# Patient Record
Sex: FEMALE | Race: WHITE | NOT HISPANIC OR LATINO | Employment: OTHER | URBAN - NONMETROPOLITAN AREA
[De-identification: names, ages, dates, MRNs, and addresses within clinical notes are randomized per-mention and may not be internally consistent; named-entity substitution may affect disease eponyms.]

---

## 2017-03-21 ENCOUNTER — OFFICE VISIT (OUTPATIENT)
Dept: CARDIOLOGY | Facility: CLINIC | Age: 67
End: 2017-03-21

## 2017-03-21 VITALS
HEART RATE: 65 BPM | WEIGHT: 115 LBS | DIASTOLIC BLOOD PRESSURE: 59 MMHG | OXYGEN SATURATION: 99 % | BODY MASS INDEX: 19.63 KG/M2 | HEIGHT: 64 IN | SYSTOLIC BLOOD PRESSURE: 121 MMHG

## 2017-03-21 DIAGNOSIS — R06.09 DYSPNEA ON EXERTION: Primary | ICD-10-CM

## 2017-03-21 DIAGNOSIS — I10 ESSENTIAL HYPERTENSION: ICD-10-CM

## 2017-03-21 DIAGNOSIS — R00.2 PALPITATIONS: ICD-10-CM

## 2017-03-21 PROCEDURE — 99213 OFFICE O/P EST LOW 20 MIN: CPT | Performed by: PHYSICIAN ASSISTANT

## 2017-03-21 RX ORDER — ATORVASTATIN CALCIUM 10 MG/1
10 TABLET, FILM COATED ORAL DAILY
COMMUNITY
Start: 2017-03-18

## 2017-03-21 RX ORDER — BENZTROPINE MESYLATE 1 MG/1
1 TABLET ORAL 2 TIMES DAILY
Refills: 2 | COMMUNITY
Start: 2017-03-13 | End: 2019-03-20 | Stop reason: DRUGHIGH

## 2017-03-21 NOTE — PROGRESS NOTES
Problem list     Subjective   Kellee Garcia is a 67 y.o. female     Chief Complaint   Patient presents with   • Follow-up     6 mo.   Problem List:      1. Minor nonobstructive CAD per cath, 2008.  2. Palpitations  3. Dyspnea, stable  4. TIA, by patient report.         HPI  The patient presents back today for routine evaluation.  Since last appointment, she has done well from cardiac standpoint.  Her palpitations remain very minimal on current verapamil dosing.  Her dyspnea seems to be at baseline.  She denies PND or orthopnea.  She relates no chest pain episodes.  She does note that her primary care provider follows labs closely.  She quotes those is normal.  The patient has no further complaints otherwise and feels that she is doing well from cardiac standpoint.    Current Outpatient Prescriptions   Medication Sig Dispense Refill   • aspirin 81 MG tablet Take 81 mg by mouth 1 (One) Time Per Week.     • atorvastatin (LIPITOR) 10 MG tablet Daily.     • benztropine (COGENTIN) 1 MG tablet 2 (Two) Times a Day.  2   • Black Cohosh 160 MG capsule Take  by mouth.     • calcium (OS-GERRY) 600 MG tablet Take  by mouth daily.     • cholecalciferol (VITAMIN D3) 1000 UNITS tablet Take 1,000 Units by mouth 2 (two) times a day.     • L-Arginine 500 MG capsule Take  by mouth.     • LIVALO 2 MG tablet tablet 2 mg Every Night.     • Omega 3 1000 MG capsule Take  by mouth Daily.     • omeprazole (PRILOSEC) 20 MG capsule Take 20 mg by mouth Daily.     • verapamil (CALAN) 40 MG tablet Take 40 mg by mouth 2 (Two) Times a Day.     • vitamin B-12 (CYANOCOBALAMIN) 100 MCG tablet Take  by mouth daily.       No current facility-administered medications for this visit.        Isosorbide nitrate and Sulfa antibiotics    Past Medical History   Diagnosis Date   • Hyperlipidemia    • Hypertension        Social History     Social History   • Marital status:      Spouse name: N/A   • Number of children: N/A   • Years of education: N/A  "    Occupational History   • Not on file.     Social History Main Topics   • Smoking status: Former Smoker   • Smokeless tobacco: Not on file   • Alcohol use No   • Drug use: No   • Sexual activity: Not on file     Other Topics Concern   • Not on file     Social History Narrative       Family History   Problem Relation Age of Onset   • Diabetes Other    • Hypertension Other        Review of Systems   Constitutional: Negative.    HENT: Negative.    Eyes: Positive for visual disturbance (wears glasses).   Respiratory: Negative.    Cardiovascular: Negative.    Gastrointestinal: Negative.    Endocrine: Negative.    Genitourinary: Negative.    Musculoskeletal: Positive for arthralgias (neck), myalgias and neck pain.   Skin: Negative.    Allergic/Immunologic: Negative.    Neurological: Negative.    Hematological: Negative.    Psychiatric/Behavioral: Negative.        Objective   Visit Vitals   • /59 (BP Location: Right arm, Patient Position: Sitting)   • Pulse 65   • Ht 64\" (162.6 cm)   • Wt 115 lb (52.2 kg)   • SpO2 99%   • BMI 19.74 kg/m2     Lab Results (most recent)     None        Physical Exam   Constitutional: She is oriented to person, place, and time. She appears well-developed and well-nourished. No distress.   HENT:   Head: Normocephalic and atraumatic.   Eyes: Conjunctivae and EOM are normal. Pupils are equal, round, and reactive to light.   Neck: Normal range of motion. Neck supple. No JVD present. No tracheal deviation present.   Cardiovascular: Normal rate, regular rhythm, normal heart sounds and intact distal pulses.    Pulmonary/Chest: Effort normal and breath sounds normal.   Abdominal: Soft. Bowel sounds are normal. She exhibits no distension and no mass. There is no tenderness. There is no rebound and no guarding.   Musculoskeletal: Normal range of motion. She exhibits no edema, tenderness or deformity.   Neurological: She is alert and oriented to person, place, and time.   Skin: Skin is warm and " dry. No rash noted. No erythema. No pallor.   Psychiatric: She has a normal mood and affect. Her behavior is normal. Judgment and thought content normal.   Nursing note and vitals reviewed.        Procedure   Procedures       Assessment/Plan      Diagnosis Plan   1. Dyspnea on exertion     2. Essential hypertension     3. Palpitations       The patient seems to be doing well at this time.  I will make no changes to medications.  We will see her back in 6 months, sooner if indicated by course.  She will call for any issues.

## 2018-03-21 ENCOUNTER — OFFICE VISIT (OUTPATIENT)
Dept: CARDIOLOGY | Facility: CLINIC | Age: 68
End: 2018-03-21

## 2018-03-21 VITALS
SYSTOLIC BLOOD PRESSURE: 126 MMHG | OXYGEN SATURATION: 100 % | HEART RATE: 64 BPM | BODY MASS INDEX: 20.18 KG/M2 | WEIGHT: 118.2 LBS | DIASTOLIC BLOOD PRESSURE: 65 MMHG | HEIGHT: 64 IN

## 2018-03-21 DIAGNOSIS — R06.02 SOB (SHORTNESS OF BREATH): Primary | ICD-10-CM

## 2018-03-21 DIAGNOSIS — R00.2 PALPITATIONS: ICD-10-CM

## 2018-03-21 DIAGNOSIS — E78.2 MIXED HYPERLIPIDEMIA: ICD-10-CM

## 2018-03-21 PROCEDURE — 93000 ELECTROCARDIOGRAM COMPLETE: CPT | Performed by: PHYSICIAN ASSISTANT

## 2018-03-21 PROCEDURE — 99213 OFFICE O/P EST LOW 20 MIN: CPT | Performed by: PHYSICIAN ASSISTANT

## 2018-03-21 NOTE — PROGRESS NOTES
Problem list     Subjective   Kellee Garcia is a 68 y.o. female     Chief Complaint   Patient presents with   • Follow-up     patient appears in office today for 6 month follow up    • Shortness of Breath   Problem List:      1. Minor nonobstructive CAD per cath, 2008.  2. Palpitations  3. Dyspnea, stable      4. TIA, by patient report.     HPI  The patient presents in today for routine evaluation follow-up.  She has done well over the past year.  She does relate to chronic palpitations which have been very minimal on verapamil therapy.  She tells me that she had recent labs with her primary care provider.  She quotes those as normal.  The patient denies chest pain.  She reports chronic, stable dyspnea.  She denies dizziness or syncope.  She exercises at moderate levels without limitation from cardiac standpoint.  She has no reproduction of dysrhythmic symptoms at that time.  The patient is doing well otherwise and has no further complaints.    Current Outpatient Prescriptions   Medication Sig Dispense Refill   • aspirin 81 MG tablet Take 81 mg by mouth 1 (One) Time Per Week.     • atorvastatin (LIPITOR) 10 MG tablet Take 10 mg by mouth Daily.     • benztropine (COGENTIN) 1 MG tablet Take 1 mg by mouth 2 (Two) Times a Day.  2   • Black Cohosh 160 MG capsule Take 160 mg by mouth Daily.     • calcium (OS-GERRY) 600 MG tablet Take 600 mg by mouth Daily.     • cholecalciferol (VITAMIN D3) 1000 UNITS tablet Take 1,000 Units by mouth 2 (two) times a day.     • L-Arginine 500 MG capsule Take 500 mg by mouth Daily.     • Omega 3 1000 MG capsule Take 1,000 mg by mouth Daily.     • omeprazole (PRILOSEC) 20 MG capsule Take 20 mg by mouth Daily.     • verapamil (CALAN) 40 MG tablet Take 40 mg by mouth 2 (Two) Times a Day.     • vitamin B-12 (CYANOCOBALAMIN) 100 MCG tablet Take 50 mcg by mouth Daily.       No current facility-administered medications for this visit.        Isosorbide nitrate and Sulfa antibiotics    Past Medical  History:   Diagnosis Date   • Hyperlipidemia    • Hypertension        Social History     Social History   • Marital status:      Spouse name: N/A   • Number of children: N/A   • Years of education: N/A     Occupational History   • Not on file.     Social History Main Topics   • Smoking status: Former Smoker   • Smokeless tobacco: Never Used   • Alcohol use No   • Drug use: No   • Sexual activity: Defer     Other Topics Concern   • Not on file     Social History Narrative   • No narrative on file       Family History   Problem Relation Age of Onset   • Diabetes Other    • Hypertension Other        Review of Systems   Constitutional: Negative.  Negative for fatigue.   HENT: Negative.  Negative for congestion, rhinorrhea, sneezing and sore throat.    Eyes: Positive for visual disturbance (wears glasses daily).   Respiratory: Positive for shortness of breath (with exertion). Negative for apnea, cough, chest tightness and wheezing.    Cardiovascular: Negative.  Negative for chest pain (denies CP), palpitations (denies palpitations) and leg swelling.   Gastrointestinal: Negative.  Negative for abdominal distention, abdominal pain, nausea and vomiting.   Endocrine: Negative.  Negative for cold intolerance, heat intolerance, polyphagia and polyuria.   Genitourinary: Negative.  Negative for difficulty urinating, frequency and urgency.   Musculoskeletal: Negative.  Negative for arthralgias, back pain, myalgias, neck pain and neck stiffness.   Skin: Negative.  Negative for rash and wound.   Allergic/Immunologic: Negative.  Negative for environmental allergies and food allergies.   Neurological: Negative.  Negative for dizziness, weakness, light-headedness and headaches.   Hematological: Negative.  Does not bruise/bleed easily.   Psychiatric/Behavioral: Negative.  Negative for agitation, confusion and sleep disturbance (denies waking up smothering/SOA). The patient is not nervous/anxious.        Objective   Vitals:     "03/21/18 0902   BP: 126/65   BP Location: Left arm   Patient Position: Sitting   Pulse: 64   SpO2: 100%   Weight: 53.6 kg (118 lb 3.2 oz)   Height: 162.6 cm (64\")      /65 (BP Location: Left arm, Patient Position: Sitting)   Pulse 64   Ht 162.6 cm (64\")   Wt 53.6 kg (118 lb 3.2 oz)   SpO2 100%   BMI 20.29 kg/m²    Lab Results (most recent)     None        Physical Exam   Constitutional: She is oriented to person, place, and time. She appears well-developed and well-nourished. No distress.   HENT:   Head: Normocephalic and atraumatic.   Eyes: Conjunctivae and EOM are normal. Pupils are equal, round, and reactive to light.   Neck: Normal range of motion. Neck supple. No JVD present. No tracheal deviation present.   Cardiovascular: Normal rate, regular rhythm, normal heart sounds and intact distal pulses.    Pulmonary/Chest: Effort normal and breath sounds normal.   Abdominal: Soft. Bowel sounds are normal. She exhibits no distension and no mass. There is no tenderness. There is no rebound and no guarding.   Musculoskeletal: Normal range of motion. She exhibits no edema, tenderness or deformity.   Neurological: She is alert and oriented to person, place, and time.   Skin: Skin is warm and dry. No rash noted. No erythema. No pallor.   Psychiatric: She has a normal mood and affect. Her behavior is normal. Judgment and thought content normal.   Nursing note and vitals reviewed.        Procedure     ECG 12 Lead  Date/Time: 3/21/2018 9:07 AM  Performed by: VILMA ROQUE  Authorized by: VILMA ROQUE   Comments: SOB    Sinus bradycardia at 50, normal axis, minor nonspecific ST and T-wave changes, no acute changes noted.               Assessment/Plan      Diagnosis Plan   1. SOB (shortness of breath)  ECG 12 Lead   2. Palpitations     3. Mixed hyperlipidemia         The patient is stable at this time.  I will make no changes.  We will see her back in one year, sooner if indicated by course.  She will call for " any issues prior to follow-up.            Discussed the patient's BMI with her. BMI is within normal parameters. No follow-up required.             Electronically signed by:

## 2019-03-20 ENCOUNTER — OFFICE VISIT (OUTPATIENT)
Dept: CARDIOLOGY | Facility: CLINIC | Age: 69
End: 2019-03-20

## 2019-03-20 VITALS
HEART RATE: 64 BPM | OXYGEN SATURATION: 96 % | SYSTOLIC BLOOD PRESSURE: 122 MMHG | DIASTOLIC BLOOD PRESSURE: 62 MMHG | WEIGHT: 115.8 LBS | BODY MASS INDEX: 19.77 KG/M2 | HEIGHT: 64 IN

## 2019-03-20 DIAGNOSIS — E78.2 MIXED HYPERLIPIDEMIA: ICD-10-CM

## 2019-03-20 DIAGNOSIS — R00.2 PALPITATIONS: Primary | ICD-10-CM

## 2019-03-20 DIAGNOSIS — R06.09 DYSPNEA ON EXERTION: ICD-10-CM

## 2019-03-20 PROCEDURE — 99213 OFFICE O/P EST LOW 20 MIN: CPT | Performed by: PHYSICIAN ASSISTANT

## 2019-03-20 RX ORDER — LINACLOTIDE 290 UG/1
290 CAPSULE, GELATIN COATED ORAL DAILY
COMMUNITY
Start: 2019-02-20

## 2019-03-20 NOTE — PROGRESS NOTES
Problem list     Subjective   Kellee Garcia is a 69 y.o. female     Chief Complaint   Patient presents with   • Coronary Artery Disease   • Hypertension   • Palpitations   Problem List:      1. Minor nonobstructive CAD per cath, 2008.  2. Palpitations  3. Dyspnea, stable      4. TIA, by patient report.     HPI  The patient presents in today for routine follow-up.  Since last appointment, she is continued to do well.  Symptoms of palpitations remain very minimal with verapamil therapy.  She has no sustained dysrhythmic activity.  The patient tells me she has stable dyspnea.  She has no chest pain.  She can exert at above average levels without reproduction of angina or limitation with cardiovascular symptoms or issues otherwise.  Blood pressures remain well controlled.  She does tell me that she has had recent laboratories.  Lipids in particular were normal.  The patient has no further complaints otherwise and feels that she is doing well.    Current Outpatient Medications   Medication Sig Dispense Refill   • aspirin 81 MG tablet Take 81 mg by mouth 1 (One) Time Per Week.     • atorvastatin (LIPITOR) 10 MG tablet Take 10 mg by mouth Daily.     • Black Cohosh 160 MG capsule Take 160 mg by mouth Daily.     • calcium (OS-GERRY) 600 MG tablet Take 600 mg by mouth Daily.     • cholecalciferol (VITAMIN D3) 1000 UNITS tablet Take 1,000 Units by mouth 2 (two) times a day.     • L-Arginine 500 MG capsule Take 500 mg by mouth Daily.     • Omega 3 1000 MG capsule Take 1,000 mg by mouth Daily.     • omeprazole (PRILOSEC) 20 MG capsule Take 20 mg by mouth Daily.     • verapamil (CALAN) 40 MG tablet Take 40 mg by mouth 2 (Two) Times a Day.     • vitamin B-12 (CYANOCOBALAMIN) 100 MCG tablet Take 50 mcg by mouth Daily.     • LINZESS 290 MCG capsule capsule Take 290 mcg by mouth Daily.       No current facility-administered medications for this visit.        Isosorbide nitrate and Sulfa antibiotics    Past Medical History:   Diagnosis  "Date   • Hyperlipidemia    • Hypertension        Social History     Socioeconomic History   • Marital status:      Spouse name: Not on file   • Number of children: Not on file   • Years of education: Not on file   • Highest education level: Not on file   Tobacco Use   • Smoking status: Former Smoker   • Smokeless tobacco: Never Used   Substance and Sexual Activity   • Alcohol use: No   • Drug use: No   • Sexual activity: Defer       Family History   Problem Relation Age of Onset   • Diabetes Other    • Hypertension Other        Review of Systems   Constitutional: Negative for fatigue.   HENT: Negative for congestion and sneezing.    Eyes: Positive for visual disturbance (contacts daily\).   Respiratory: Negative.  Negative for apnea, cough and wheezing.    Cardiovascular: Negative.  Negative for chest pain (denies CP), palpitations and leg swelling.   Gastrointestinal: Negative.  Negative for abdominal pain, nausea and vomiting.   Endocrine: Negative.  Negative for cold intolerance and heat intolerance.   Genitourinary: Negative.  Negative for difficulty urinating, frequency and urgency.   Musculoskeletal: Negative.  Negative for arthralgias, back pain, neck pain and neck stiffness.   Allergic/Immunologic: Negative.  Negative for environmental allergies and food allergies.   Neurological: Negative.  Negative for dizziness, syncope, weakness, light-headedness and headaches.   Hematological: Negative.  Does not bruise/bleed easily.   Psychiatric/Behavioral: Negative for agitation, confusion and sleep disturbance (denies wakening felling smothering/SOA). The patient is not nervous/anxious.        Objective   Vitals:    03/20/19 0850   BP: 122/62   Pulse: 64   SpO2: 96%   Weight: 52.5 kg (115 lb 12.8 oz)   Height: 162.6 cm (64\")      /62   Pulse 64   Ht 162.6 cm (64\")   Wt 52.5 kg (115 lb 12.8 oz)   SpO2 96%   BMI 19.88 kg/m²    Lab Results (most recent)     None        Physical Exam   Constitutional: " She is oriented to person, place, and time. She appears well-developed and well-nourished. No distress.   HENT:   Head: Normocephalic and atraumatic.   Eyes: Conjunctivae and EOM are normal. Pupils are equal, round, and reactive to light.   Neck: Normal range of motion. Neck supple. No JVD present. No tracheal deviation present.   Cardiovascular: Normal rate, regular rhythm, normal heart sounds and intact distal pulses.   Pulmonary/Chest: Effort normal and breath sounds normal.   Abdominal: Soft. Bowel sounds are normal. She exhibits no distension and no mass. There is no tenderness. There is no rebound and no guarding.   Musculoskeletal: Normal range of motion. She exhibits no edema, tenderness or deformity.   Neurological: She is alert and oriented to person, place, and time.   Skin: Skin is warm and dry. No rash noted. No erythema. No pallor.   Psychiatric: She has a normal mood and affect. Her behavior is normal. Judgment and thought content normal.   Nursing note and vitals reviewed.        Procedure   Procedures       Assessment/Plan      Diagnosis Plan   1. Palpitations     2. Dyspnea on exertion     3. Mixed hyperlipidemia         The patient continues to do well from a cardiovascular standpoint.  Symptoms of palpitations are now minimal with verapamil therapy.  Most recent lipids, per patient report, were normal.  We will continue atorvastatin therapy with no changes in that regard.  For any complications, the patient will call.  Otherwise, we will see her back on 6-month intervals.              Patient's Body mass index is 19.88 kg/m². BMI is below normal parameters. Recommendations include: referral to primary care.             Electronically signed by:

## 2020-09-22 ENCOUNTER — OFFICE VISIT (OUTPATIENT)
Dept: CARDIOLOGY | Facility: CLINIC | Age: 70
End: 2020-09-22

## 2020-09-22 VITALS
HEIGHT: 64 IN | OXYGEN SATURATION: 99 % | DIASTOLIC BLOOD PRESSURE: 78 MMHG | BODY MASS INDEX: 20.01 KG/M2 | TEMPERATURE: 97.5 F | HEART RATE: 56 BPM | SYSTOLIC BLOOD PRESSURE: 126 MMHG | WEIGHT: 117.2 LBS

## 2020-09-22 DIAGNOSIS — R06.09 DYSPNEA ON EXERTION: ICD-10-CM

## 2020-09-22 DIAGNOSIS — R00.2 PALPITATIONS: ICD-10-CM

## 2020-09-22 DIAGNOSIS — I10 ESSENTIAL HYPERTENSION: Primary | ICD-10-CM

## 2020-09-22 PROCEDURE — 99213 OFFICE O/P EST LOW 20 MIN: CPT | Performed by: PHYSICIAN ASSISTANT

## 2020-09-22 PROCEDURE — 93000 ELECTROCARDIOGRAM COMPLETE: CPT | Performed by: PHYSICIAN ASSISTANT

## 2020-09-22 RX ORDER — PANTOPRAZOLE SODIUM 40 MG/1
TABLET, DELAYED RELEASE ORAL DAILY
COMMUNITY
Start: 2020-07-22

## 2020-09-22 NOTE — PROGRESS NOTES
Problem list     Subjective   Kellee Garcia is a 70 y.o. female     Chief Complaint   Patient presents with   • Follow-up     Here for a one year follow up    • Hypertension   • Shortness of Breath   Problem List:      1. Minor nonobstructive CAD per cath, 2008.  2. Palpitations  3. Dyspnea, stable      4. TIA, by patient report.     HPI  This pleasant patient presents back into the the clinic today for routine evaluation and follow-up.  We have seen her given cardiovascular history as outlined above.  Currently, the patient is doing well.  She still reports no significant palpitations on verapamil therapy.  She denies dizziness or syncope.  She has stable dyspnea.  She has no chest pain.  She denies failure symptoms.  Labs are followed closely by her primary care provider and reported as normal by the patient.  She has no further complaints otherwise and feels that she is doing well.    Current Outpatient Medications on File Prior to Visit   Medication Sig Dispense Refill   • aspirin 81 MG tablet Take 81 mg by mouth 1 (One) Time Per Week.     • atorvastatin (LIPITOR) 10 MG tablet Take 10 mg by mouth Daily.     • Black Cohosh 160 MG capsule Take 160 mg by mouth Daily.     • calcium (OS-GERRY) 600 MG tablet Take 600 mg by mouth Daily.     • cholecalciferol (VITAMIN D3) 1000 UNITS tablet Take 1,000 Units by mouth 2 (two) times a day.     • L-Arginine 500 MG capsule Take 500 mg by mouth Daily.     • LINZESS 290 MCG capsule capsule Take 290 mcg by mouth Daily.     • Omega 3 1000 MG capsule Take 1,000 mg by mouth Daily.     • pantoprazole (PROTONIX) 40 MG EC tablet Daily.     • verapamil (CALAN) 40 MG tablet Take 40 mg by mouth 2 (Two) Times a Day.     • vitamin B-12 (CYANOCOBALAMIN) 100 MCG tablet Take 50 mcg by mouth Daily.     • [DISCONTINUED] omeprazole (PRILOSEC) 20 MG capsule Take 20 mg by mouth Daily.       No current facility-administered medications on file prior to visit.        Isosorbide nitrate and Sulfa  "antibiotics    Past Medical History:   Diagnosis Date   • Hyperlipidemia    • Hypertension        Social History     Socioeconomic History   • Marital status:      Spouse name: Not on file   • Number of children: Not on file   • Years of education: Not on file   • Highest education level: Not on file   Tobacco Use   • Smoking status: Former Smoker   • Smokeless tobacco: Never Used   Substance and Sexual Activity   • Alcohol use: No   • Drug use: No   • Sexual activity: Defer       Family History   Problem Relation Age of Onset   • Diabetes Other    • Hypertension Other        Review of Systems   Constitutional: Negative.  Negative for chills, diaphoresis, fatigue and fever.   HENT: Negative.    Respiratory: Positive for shortness of breath (on exertion). Negative for apnea, cough, chest tightness and wheezing.    Cardiovascular: Negative.  Negative for chest pain, palpitations and leg swelling.   Gastrointestinal: Negative.  Negative for abdominal pain, blood in stool, constipation, diarrhea, nausea and vomiting.   Genitourinary: Negative.  Negative for hematuria.   Musculoskeletal: Negative.  Negative for arthralgias, back pain, myalgias and neck pain.   Skin: Negative.  Negative for rash and wound.   Allergic/Immunologic: Negative.  Negative for environmental allergies and food allergies.   Neurological: Negative.  Negative for dizziness, syncope, weakness, light-headedness, numbness and headaches.   Hematological: Bruises/bleeds easily.   Psychiatric/Behavioral: Positive for agitation. Negative for sleep disturbance. The patient is nervous/anxious.        Objective   Vitals:    09/22/20 1249   BP: 126/78   BP Location: Left arm   Patient Position: Sitting   Pulse: 56   Temp: 97.5 °F (36.4 °C)   SpO2: 99%   Weight: 53.2 kg (117 lb 3.2 oz)   Height: 162.6 cm (64\")      /78 (BP Location: Left arm, Patient Position: Sitting)   Pulse 56   Temp 97.5 °F (36.4 °C)   Ht 162.6 cm (64\")   Wt 53.2 kg (117 lb " 3.2 oz)   SpO2 99%   BMI 20.12 kg/m²    Lab Results (most recent)     None        Physical Exam  Vitals signs and nursing note reviewed.   Constitutional:       General: She is not in acute distress.     Appearance: She is well-developed.   HENT:      Head: Normocephalic and atraumatic.   Eyes:      Conjunctiva/sclera: Conjunctivae normal.      Pupils: Pupils are equal, round, and reactive to light.   Neck:      Musculoskeletal: Normal range of motion and neck supple.      Vascular: No JVD.      Trachea: No tracheal deviation.   Cardiovascular:      Rate and Rhythm: Normal rate and regular rhythm.      Heart sounds: Normal heart sounds.   Pulmonary:      Effort: Pulmonary effort is normal.      Breath sounds: Normal breath sounds.   Abdominal:      General: Bowel sounds are normal. There is no distension.      Palpations: Abdomen is soft. There is no mass.      Tenderness: There is no abdominal tenderness. There is no guarding or rebound.   Musculoskeletal: Normal range of motion.         General: No tenderness or deformity.   Skin:     General: Skin is warm and dry.      Coloration: Skin is not pale.      Findings: No erythema or rash.   Neurological:      Mental Status: She is alert and oriented to person, place, and time.   Psychiatric:         Behavior: Behavior normal.         Thought Content: Thought content normal.         Judgment: Judgment normal.           Procedure     ECG 12 Lead    Date/Time: 9/22/2020 1:16 PM  Performed by: Eamon Gayle PA  Authorized by: Eamon Gayle PA   Comparison: compared with previous ECG from 3/21/2018  Comparison to previous ECG: Sinus rhythm at 57, normal axis, minor nonspecific ST and T wave changes, no acute changes noted.                 Assessment/Plan      Diagnosis Plan   1. Essential hypertension  ECG 12 Lead   2. Dyspnea on exertion     3. Palpitations       1.  At this time, the patient appears to be doing well from cardiovascular standpoint.  The patient  reports no current angina.  She has stable dyspnea.  She denies failure issues otherwise.  Palpitations remain very well treated on verapamil therapy.    2.  She remains normotensive on current medical regimen as well.  She will continue to monitor that closely at home and will call to us for any issues.    3.  In that setting, as the patient is doing well, we will make no changes or adjustments otherwise.  We will continue to see her on yearly evaluation.  She will call for complications.         Kellee BECKY Garcia  reports that she has quit smoking. She has never used smokeless tobacco..        Patient's Body mass index is 20.12 kg/m². BMI is within normal parameters. No follow-up required..     Advance Care Planning   ACP discussion was declined by the patient. Patient has an advance directive in EMR which is still valid.           Electronically signed by:

## 2021-05-21 ENCOUNTER — IMMUNIZATION (OUTPATIENT)
Dept: VACCINE CLINIC | Facility: HOSPITAL | Age: 71
End: 2021-05-21

## 2021-05-21 PROCEDURE — 0001A: CPT | Performed by: INTERNAL MEDICINE

## 2021-05-21 PROCEDURE — 91300 HC SARSCOV02 VAC 30MCG/0.3ML IM: CPT | Performed by: INTERNAL MEDICINE

## 2021-06-03 ENCOUNTER — HOSPITAL ENCOUNTER (EMERGENCY)
Facility: HOSPITAL | Age: 71
Discharge: HOME | End: 2021-06-03
Attending: FAMILY MEDICINE | Admitting: FAMILY MEDICINE

## 2021-06-03 ENCOUNTER — APPOINTMENT (OUTPATIENT)
Dept: CT IMAGING | Facility: HOSPITAL | Age: 71
End: 2021-06-03

## 2021-06-03 ENCOUNTER — APPOINTMENT (OUTPATIENT)
Dept: GENERAL RADIOLOGY | Facility: HOSPITAL | Age: 71
End: 2021-06-03

## 2021-06-03 VITALS
DIASTOLIC BLOOD PRESSURE: 64 MMHG | TEMPERATURE: 97.5 F | WEIGHT: 105 LBS | RESPIRATION RATE: 18 BRPM | BODY MASS INDEX: 17.93 KG/M2 | OXYGEN SATURATION: 99 % | HEIGHT: 64 IN | SYSTOLIC BLOOD PRESSURE: 133 MMHG | HEART RATE: 74 BPM

## 2021-06-03 DIAGNOSIS — R10.84 GENERALIZED ABDOMINAL PAIN: Primary | ICD-10-CM

## 2021-06-03 DIAGNOSIS — N39.0 ACUTE UTI: ICD-10-CM

## 2021-06-03 LAB
ALBUMIN SERPL-MCNC: 3.96 G/DL (ref 3.5–5.2)
ALBUMIN/GLOB SERPL: 1.3 G/DL
ALP SERPL-CCNC: 84 U/L (ref 39–117)
ALT SERPL W P-5'-P-CCNC: 19 U/L (ref 1–33)
AMYLASE SERPL-CCNC: 99 U/L (ref 28–100)
ANION GAP SERPL CALCULATED.3IONS-SCNC: 10.8 MMOL/L (ref 5–15)
AST SERPL-CCNC: 20 U/L (ref 1–32)
BACTERIA UR QL AUTO: ABNORMAL /HPF
BASOPHILS # BLD AUTO: 0.05 10*3/MM3 (ref 0–0.2)
BASOPHILS NFR BLD AUTO: 0.6 % (ref 0–1.5)
BILIRUB SERPL-MCNC: 0.5 MG/DL (ref 0–1.2)
BILIRUB UR QL STRIP: NEGATIVE
BUN SERPL-MCNC: 14 MG/DL (ref 8–23)
BUN/CREAT SERPL: 15.4 (ref 7–25)
CALCIUM SPEC-SCNC: 9.4 MG/DL (ref 8.6–10.5)
CHLORIDE SERPL-SCNC: 101 MMOL/L (ref 98–107)
CLARITY UR: CLEAR
CO2 SERPL-SCNC: 27.2 MMOL/L (ref 22–29)
COLOR UR: YELLOW
CREAT SERPL-MCNC: 0.91 MG/DL (ref 0.57–1)
CRP SERPL-MCNC: <0.3 MG/DL (ref 0–0.5)
DEPRECATED RDW RBC AUTO: 45.3 FL (ref 37–54)
EOSINOPHIL # BLD AUTO: 0.04 10*3/MM3 (ref 0–0.4)
EOSINOPHIL NFR BLD AUTO: 0.5 % (ref 0.3–6.2)
ERYTHROCYTE [DISTWIDTH] IN BLOOD BY AUTOMATED COUNT: 12.8 % (ref 12.3–15.4)
GFR SERPL CREATININE-BSD FRML MDRD: 61 ML/MIN/1.73
GLOBULIN UR ELPH-MCNC: 3.1 GM/DL
GLUCOSE SERPL-MCNC: 115 MG/DL (ref 65–99)
GLUCOSE UR STRIP-MCNC: NEGATIVE MG/DL
HCT VFR BLD AUTO: 40.1 % (ref 34–46.6)
HGB BLD-MCNC: 13.1 G/DL (ref 12–15.9)
HGB UR QL STRIP.AUTO: ABNORMAL
HOLD SPECIMEN: NORMAL
HOLD SPECIMEN: NORMAL
HYALINE CASTS UR QL AUTO: ABNORMAL /LPF
IMM GRANULOCYTES # BLD AUTO: 0.02 10*3/MM3 (ref 0–0.05)
IMM GRANULOCYTES NFR BLD AUTO: 0.2 % (ref 0–0.5)
KETONES UR QL STRIP: NEGATIVE
LEUKOCYTE ESTERASE UR QL STRIP.AUTO: ABNORMAL
LIPASE SERPL-CCNC: 46 U/L (ref 13–60)
LYMPHOCYTES # BLD AUTO: 1.07 10*3/MM3 (ref 0.7–3.1)
LYMPHOCYTES NFR BLD AUTO: 13.3 % (ref 19.6–45.3)
MAGNESIUM SERPL-MCNC: 1.9 MG/DL (ref 1.6–2.4)
MCH RBC QN AUTO: 31.3 PG (ref 26.6–33)
MCHC RBC AUTO-ENTMCNC: 32.7 G/DL (ref 31.5–35.7)
MCV RBC AUTO: 95.9 FL (ref 79–97)
MONOCYTES # BLD AUTO: 0.73 10*3/MM3 (ref 0.1–0.9)
MONOCYTES NFR BLD AUTO: 9.1 % (ref 5–12)
NEUTROPHILS NFR BLD AUTO: 6.13 10*3/MM3 (ref 1.7–7)
NEUTROPHILS NFR BLD AUTO: 76.3 % (ref 42.7–76)
NITRITE UR QL STRIP: NEGATIVE
NRBC BLD AUTO-RTO: 0 /100 WBC (ref 0–0.2)
PH UR STRIP.AUTO: 6.5 [PH] (ref 5–8)
PLATELET # BLD AUTO: 362 10*3/MM3 (ref 140–450)
PMV BLD AUTO: 8.7 FL (ref 6–12)
POTASSIUM SERPL-SCNC: 3.6 MMOL/L (ref 3.5–5.2)
PROT SERPL-MCNC: 7.1 G/DL (ref 6–8.5)
PROT UR QL STRIP: NEGATIVE
RBC # BLD AUTO: 4.18 10*6/MM3 (ref 3.77–5.28)
RBC # UR: ABNORMAL /HPF
REF LAB TEST METHOD: ABNORMAL
SODIUM SERPL-SCNC: 139 MMOL/L (ref 136–145)
SP GR UR STRIP: 1.01 (ref 1–1.03)
SQUAMOUS #/AREA URNS HPF: ABNORMAL /HPF
TROPONIN T SERPL-MCNC: <0.01 NG/ML (ref 0–0.03)
TROPONIN T SERPL-MCNC: <0.01 NG/ML (ref 0–0.03)
UROBILINOGEN UR QL STRIP: ABNORMAL
WBC # BLD AUTO: 8.04 10*3/MM3 (ref 3.4–10.8)
WBC UR QL AUTO: ABNORMAL /HPF
WHOLE BLOOD HOLD SPECIMEN: NORMAL
WHOLE BLOOD HOLD SPECIMEN: NORMAL

## 2021-06-03 PROCEDURE — 87077 CULTURE AEROBIC IDENTIFY: CPT | Performed by: PHYSICIAN ASSISTANT

## 2021-06-03 PROCEDURE — 71045 X-RAY EXAM CHEST 1 VIEW: CPT | Performed by: RADIOLOGY

## 2021-06-03 PROCEDURE — 83735 ASSAY OF MAGNESIUM: CPT | Performed by: PHYSICIAN ASSISTANT

## 2021-06-03 PROCEDURE — 85025 COMPLETE CBC W/AUTO DIFF WBC: CPT | Performed by: PHYSICIAN ASSISTANT

## 2021-06-03 PROCEDURE — 87086 URINE CULTURE/COLONY COUNT: CPT | Performed by: PHYSICIAN ASSISTANT

## 2021-06-03 PROCEDURE — 83690 ASSAY OF LIPASE: CPT | Performed by: PHYSICIAN ASSISTANT

## 2021-06-03 PROCEDURE — 74176 CT ABD & PELVIS W/O CONTRAST: CPT

## 2021-06-03 PROCEDURE — 82150 ASSAY OF AMYLASE: CPT | Performed by: PHYSICIAN ASSISTANT

## 2021-06-03 PROCEDURE — 96361 HYDRATE IV INFUSION ADD-ON: CPT

## 2021-06-03 PROCEDURE — 71045 X-RAY EXAM CHEST 1 VIEW: CPT

## 2021-06-03 PROCEDURE — 86140 C-REACTIVE PROTEIN: CPT | Performed by: PHYSICIAN ASSISTANT

## 2021-06-03 PROCEDURE — 93010 ELECTROCARDIOGRAM REPORT: CPT | Performed by: INTERNAL MEDICINE

## 2021-06-03 PROCEDURE — 81001 URINALYSIS AUTO W/SCOPE: CPT | Performed by: PHYSICIAN ASSISTANT

## 2021-06-03 PROCEDURE — 87186 SC STD MICRODIL/AGAR DIL: CPT | Performed by: PHYSICIAN ASSISTANT

## 2021-06-03 PROCEDURE — 74176 CT ABD & PELVIS W/O CONTRAST: CPT | Performed by: RADIOLOGY

## 2021-06-03 PROCEDURE — 80053 COMPREHEN METABOLIC PANEL: CPT | Performed by: PHYSICIAN ASSISTANT

## 2021-06-03 PROCEDURE — 99283 EMERGENCY DEPT VISIT LOW MDM: CPT

## 2021-06-03 PROCEDURE — 84484 ASSAY OF TROPONIN QUANT: CPT | Performed by: PHYSICIAN ASSISTANT

## 2021-06-03 PROCEDURE — 93005 ELECTROCARDIOGRAM TRACING: CPT | Performed by: PHYSICIAN ASSISTANT

## 2021-06-03 PROCEDURE — 96360 HYDRATION IV INFUSION INIT: CPT

## 2021-06-03 RX ORDER — CEFDINIR 300 MG/1
300 CAPSULE ORAL 2 TIMES DAILY
Qty: 14 CAPSULE | Refills: 0 | Status: SHIPPED | OUTPATIENT
Start: 2021-06-03 | End: 2021-06-10

## 2021-06-03 RX ORDER — ALUMINA, MAGNESIA, AND SIMETHICONE 2400; 2400; 240 MG/30ML; MG/30ML; MG/30ML
15 SUSPENSION ORAL ONCE
Status: COMPLETED | OUTPATIENT
Start: 2021-06-03 | End: 2021-06-03

## 2021-06-03 RX ORDER — SODIUM CHLORIDE 9 MG/ML
75 INJECTION, SOLUTION INTRAVENOUS CONTINUOUS
Status: DISCONTINUED | OUTPATIENT
Start: 2021-06-03 | End: 2021-06-03 | Stop reason: HOSPADM

## 2021-06-03 RX ORDER — SUCRALFATE 1 G/1
1 TABLET ORAL 4 TIMES DAILY
Qty: 56 TABLET | Refills: 0 | Status: SHIPPED | OUTPATIENT
Start: 2021-06-03

## 2021-06-03 RX ORDER — SODIUM CHLORIDE 0.9 % (FLUSH) 0.9 %
10 SYRINGE (ML) INJECTION AS NEEDED
Status: DISCONTINUED | OUTPATIENT
Start: 2021-06-03 | End: 2021-06-03 | Stop reason: HOSPADM

## 2021-06-03 RX ORDER — LIDOCAINE HYDROCHLORIDE 20 MG/ML
15 SOLUTION OROPHARYNGEAL ONCE
Status: COMPLETED | OUTPATIENT
Start: 2021-06-03 | End: 2021-06-03

## 2021-06-03 RX ADMIN — LIDOCAINE HYDROCHLORIDE 15 ML: 20 SOLUTION ORAL; TOPICAL at 11:44

## 2021-06-03 RX ADMIN — SODIUM CHLORIDE 75 ML/HR: 9 INJECTION, SOLUTION INTRAVENOUS at 11:44

## 2021-06-03 RX ADMIN — SODIUM CHLORIDE 75 ML/HR: 9 INJECTION, SOLUTION INTRAVENOUS at 14:01

## 2021-06-03 RX ADMIN — ALUMINUM HYDROXIDE, MAGNESIUM HYDROXIDE, AND DIMETHICONE 15 ML: 400; 400; 40 SUSPENSION ORAL at 11:44

## 2021-06-03 NOTE — ED NOTES
I WENT TO GET PT SECOND TROP PT WAS NOT IN ROOM AT THIS TIME.      Martina Lowry P  06/03/21 [id number]

## 2021-06-03 NOTE — ED PROVIDER NOTES
"Subjective   71-year-old female who presents to the ED today for a burning abdominal pain.  She states this has been going on for about 5 days.  She states she is having a burning pain from her lower abdomen that goes all the way up into her mouth.  She states that she is having a burning sensation to her lips.  She states it has progressively gotten worse.  She also states she feels like the skin on her arms is on fire.  She denies any sort of rash.  She states she does have a history of GERD but this does not feel like that.  She states eating does not change her symptoms.  She is already on Protonix and she has also tried some Pepcid at home.  She states \"it feels like a chemical is eating my stomach.\"  She denies any fever.  She denies any nausea, vomiting or diarrhea.  She states she has been having normal bowel movements.  She denies any blood in her stool or black tarry stool.  She states she has had some dysuria.  She states she did recently get her first Covid vaccine on May 20.  She states she got the Pfizer vaccine.      History provided by:  Patient  Abdominal Pain  Pain location:  Generalized  Pain quality: burning    Pain radiation: to her mouth.  Pain severity:  Moderate  Onset quality:  Gradual  Duration:  5 days  Timing:  Constant  Progression:  Worsening  Chronicity:  New  Relieved by:  Nothing  Worsened by:  Nothing  Associated symptoms: dysuria    Associated symptoms: no anorexia, no chest pain, no chills, no constipation, no cough, no diarrhea, no fatigue, no fever, no hematemesis, no hematochezia, no hematuria, no melena, no nausea, no shortness of breath and no vomiting        Review of Systems   Constitutional: Negative.  Negative for chills, fatigue and fever.   HENT: Negative.    Eyes: Negative.    Respiratory: Negative.  Negative for cough and shortness of breath.    Cardiovascular: Negative for chest pain.   Gastrointestinal: Positive for abdominal pain. Negative for anorexia, " constipation, diarrhea, hematemesis, hematochezia, melena, nausea and vomiting.   Genitourinary: Positive for dysuria. Negative for difficulty urinating and hematuria.   Musculoskeletal: Negative.    Skin: Negative.    Neurological: Negative.    Psychiatric/Behavioral: Negative.    All other systems reviewed and are negative.      Past Medical History:   Diagnosis Date   • Hyperlipidemia    • Hypertension        Allergies   Allergen Reactions   • Contrast Dye Unknown - Low Severity   • Isosorbide Nitrate Headache     Dinitrate TABS   • Sulfa Antibiotics Rash       Past Surgical History:   Procedure Laterality Date   • ANKLE SURGERY     • PROSTATE SURGERY     • SHOULDER SURGERY         Family History   Problem Relation Age of Onset   • Diabetes Other    • Hypertension Other        Social History     Socioeconomic History   • Marital status:      Spouse name: Not on file   • Number of children: Not on file   • Years of education: Not on file   • Highest education level: Not on file   Tobacco Use   • Smoking status: Former Smoker   • Smokeless tobacco: Never Used   Substance and Sexual Activity   • Alcohol use: No   • Drug use: No   • Sexual activity: Defer           Objective   Physical Exam  Vitals and nursing note reviewed.   Constitutional:       General: She is not in acute distress.     Appearance: She is well-developed. She is not diaphoretic.   HENT:      Head: Normocephalic and atraumatic.      Mouth/Throat:      Mouth: Mucous membranes are moist.      Pharynx: Oropharynx is clear. No pharyngeal swelling or oropharyngeal exudate.   Eyes:      Extraocular Movements: Extraocular movements intact.      Pupils: Pupils are equal, round, and reactive to light.   Cardiovascular:      Rate and Rhythm: Normal rate and regular rhythm.      Heart sounds: Normal heart sounds.   Pulmonary:      Effort: Pulmonary effort is normal.      Breath sounds: Normal breath sounds.   Chest:      Chest wall: No tenderness.    Abdominal:      General: Bowel sounds are normal.      Palpations: Abdomen is soft.      Tenderness: There is no abdominal tenderness. There is no right CVA tenderness, left CVA tenderness, guarding or rebound.   Skin:     General: Skin is warm and dry.      Capillary Refill: Capillary refill takes less than 2 seconds.      Findings: No rash.   Neurological:      General: No focal deficit present.      Mental Status: She is alert and oriented to person, place, and time.   Psychiatric:         Mood and Affect: Mood normal.         Procedures           ED Course  ED Course as of Jun 03 1610   Thu Jun 03, 2021   1120 EKG normal sinus rhythm at rate 75 parable 44 QRS 72  no ST elevation    [BB]   1228 FINDINGS:    Lungs:  Emphysema is noted with mild hyperinflation.    Pleural space:  Unremarkable.  No pneumothorax.    Heart:  Unremarkable.  No cardiomegaly.    Mediastinum:  Unremarkable.    Bones/joints:  Unremarkable.     IMPRESSION:    No acute cardiopulmonary findings identified.   XR Chest 1 View [AH]   1233 Patient reports that the GI cocktail did help for a little bit but then her symptoms came back.  She also reports a tick bite under her right breast.  She removed the tick as soon as she noticed it.  No evidence of any rash at this time.    [AH]   1239 Patient reports an allergy to IV contrast at this time.    [AH]   1351 FINDINGS:    Lung bases:  Emphysema of the lung bases.      ABDOMEN:    Liver:  Numerous low-density liver lesions compatible with multiple  hepatic cysts. There is a dominant cyst in the right lobe of liver that  is 12.3 x 10.6 cm.  The liver is otherwise unremarkable.    Gallbladder and bile ducts:  Unremarkable.  No calcified stones.  No  ductal dilation.    Pancreas:  Unremarkable.  No ductal dilation.    Spleen:  Unremarkable.  No splenomegaly.    Adrenals:  Unremarkable.  No mass.    Kidneys and ureters:  Unremarkable.  No obstructing stones.  No  hydronephrosis.    Stomach and  bowel:  Unremarkable.  No obstruction.  No mucosal  thickening.      PELVIS:    Appendix:  The appendix is nonvisualized.    Bladder:  Unremarkable.  No stones.    Reproductive:  Hysterectomy changes are noted.      ABDOMEN and PELVIS:    Intraperitoneal space:  No pneumoperitoneum identified.  No  significant fluid collection.    Bones/joints:  Degenerative changes of the lumbar spine with mild  stenosis noted.  No acute fracture.  No dislocation.    Soft tissues:  Unremarkable.    Vasculature:  Unremarkable.  No abdominal aortic aneurysm.    Lymph nodes:  Unremarkable.  No enlarged lymph nodes.     IMPRESSION:  1.  No acute findings identified within abdomen or pelvis.  2.  Multiple hepatic cysts with a dominant 12.3 cm cyst in the right  lobe of liver.  3.  Other incidental and nonacute findings detailed above.   CT Abdomen Pelvis Without Contrast [AH]   1423 No acute findings on the patient's ED work-up.  She did have 13-20 white blood cells on her microscopic urinalysis.  There is no bacteria seen.  She states she has been having dysuria.  She will be treated for an acute UTI with Omnicef.  We will also start her on Carafate to see if this helps with the burning in her abdomen.  She has had 2 troponins that have been negative.  There are no other acute findings on her lab work.  Her CT scan and chest x-ray show no acute findings.  At this time she will be able to be discharged home to follow-up outpatient.  She already has a scheduled appointment with her GI provider on June 14.  She will return to the ED if her symptoms change or worsen.    [AH]      ED Course User Index  [AH] Kiara Mata PA  [BB] James Becker MD                                           MDM  Number of Diagnoses or Management Options     Amount and/or Complexity of Data Reviewed  Clinical lab tests: reviewed  Tests in the radiology section of CPT®: reviewed  Tests in the medicine section of CPT®: reviewed    Patient Progress  Patient  progress: stable      Final diagnoses:   Generalized abdominal pain   Acute UTI       ED Disposition  ED Disposition     ED Disposition Condition Comment    Discharge Stable           Wilmer Moser MD  [address] [id number] [phone]    Schedule an appointment as soon as possible for a visit in 4 days           Medication List      New Prescriptions    cefdinir 300 MG capsule  Commonly known as: OMNICEF  Take 1 capsule by mouth 2 (Two) Times a Day for 7 days.     sucralfate 1 g tablet  Commonly known as: CARAFATE  Take 1 tablet by mouth 4 (Four) Times a Day.           Where to Get Your Medications      You can get these medications from any pharmacy    Bring a paper prescription for each of these medications  · cefdinir 300 MG capsule  · sucralfate 1 g tablet          Kiara Mata PA  06/03/21 [id number]

## 2021-06-03 NOTE — ED NOTES
Went to get Consent signed for CT with contrast, pt states she is allergic to contrast dye. Kiara BRITO made aware.        Jen Koroma RN  06/03/21 1234       Jen Koroma RN  06/03/21 1230

## 2021-06-05 LAB
BACTERIA SPEC AEROBE CULT: ABNORMAL
QT INTERVAL: 394 MS
QTC INTERVAL: 439 MS

## 2021-06-11 ENCOUNTER — IMMUNIZATION (OUTPATIENT)
Dept: VACCINE CLINIC | Facility: HOSPITAL | Age: 71
End: 2021-06-11

## 2021-06-11 PROCEDURE — 0002A: CPT | Performed by: INTERNAL MEDICINE

## 2021-06-11 PROCEDURE — 91300 HC SARSCOV02 VAC 30MCG/0.3ML IM: CPT | Performed by: INTERNAL MEDICINE

## 2021-09-22 ENCOUNTER — OFFICE VISIT (OUTPATIENT)
Dept: CARDIOLOGY | Facility: CLINIC | Age: 71
End: 2021-09-22

## 2021-09-22 VITALS
BODY MASS INDEX: 19.6 KG/M2 | WEIGHT: 114.8 LBS | HEART RATE: 55 BPM | OXYGEN SATURATION: 99 % | SYSTOLIC BLOOD PRESSURE: 134 MMHG | DIASTOLIC BLOOD PRESSURE: 51 MMHG | HEIGHT: 64 IN

## 2021-09-22 DIAGNOSIS — I10 ESSENTIAL HYPERTENSION: ICD-10-CM

## 2021-09-22 DIAGNOSIS — R06.09 DYSPNEA ON EXERTION: Primary | ICD-10-CM

## 2021-09-22 DIAGNOSIS — R00.2 PALPITATIONS: ICD-10-CM

## 2021-09-22 PROCEDURE — 99213 OFFICE O/P EST LOW 20 MIN: CPT | Performed by: PHYSICIAN ASSISTANT

## 2021-09-22 NOTE — PATIENT INSTRUCTIONS

## 2021-09-22 NOTE — PROGRESS NOTES
Problem list     Subjective   Kellee Garcia is a 71 y.o. female     Chief Complaint   Patient presents with   • Follow-up   Problem List:      1. Minor nonobstructive CAD per cath, 2008.  2. Palpitations  3. Dyspnea, stable      4. TIA, by patient report.     HPI  The patient presents into the clinic today for yearly follow-up.  Since last evaluation, the patient is continued to do well.  She really has had no further palpitations of any significance.  Her dyspnea is at baseline.  She has stable fatigue.  The patient has no failure or further dysrhythmic symptoms at this time.  Medications are very well-tolerated for the patient.  Since being on verapamil, again, she really has had no issues with palpitations.  She has no further complaints otherwise and feels that she is doing well.  She does note that recent laboratories have been normal through her primary care provider.    Current Outpatient Medications on File Prior to Visit   Medication Sig Dispense Refill   • aspirin 81 MG tablet Take 81 mg by mouth 1 (One) Time Per Week.     • atorvastatin (LIPITOR) 10 MG tablet Take 10 mg by mouth Daily.     • Black Cohosh 160 MG capsule Take 160 mg by mouth Daily.     • calcium (OS-GERRY) 600 MG tablet Take 600 mg by mouth Daily.     • cholecalciferol (VITAMIN D3) 1000 UNITS tablet Take 1,000 Units by mouth 2 (two) times a day.     • LINZESS 290 MCG capsule capsule Take 290 mcg by mouth Daily.     • Omega 3 1000 MG capsule Take 1,000 mg by mouth Daily.     • pantoprazole (PROTONIX) 40 MG EC tablet Daily.     • sucralfate (CARAFATE) 1 g tablet Take 1 tablet by mouth 4 (Four) Times a Day. 56 tablet 0   • verapamil (CALAN) 40 MG tablet Take 40 mg by mouth 2 (Two) Times a Day.     • vitamin B-12 (CYANOCOBALAMIN) 100 MCG tablet Take 50 mcg by mouth Daily.     • L-Arginine 500 MG capsule Take 500 mg by mouth Daily.       No current facility-administered medications on file prior to visit.       Contrast dye, Isosorbide nitrate,  "and Sulfa antibiotics    Past Medical History:   Diagnosis Date   • Hyperlipidemia    • Hypertension        Social History     Socioeconomic History   • Marital status:      Spouse name: Not on file   • Number of children: Not on file   • Years of education: Not on file   • Highest education level: Not on file   Tobacco Use   • Smoking status: Former Smoker   • Smokeless tobacco: Never Used   Substance and Sexual Activity   • Alcohol use: No   • Drug use: No   • Sexual activity: Defer       Family History   Problem Relation Age of Onset   • Diabetes Other    • Hypertension Other        Review of Systems   Constitutional: Negative.  Negative for chills, fatigue and fever.   HENT: Positive for rhinorrhea. Negative for congestion and sore throat.    Eyes: Positive for visual disturbance (glasses / contacts).   Respiratory: Negative.  Negative for chest tightness, shortness of breath and wheezing.    Cardiovascular: Negative.  Negative for chest pain, palpitations and leg swelling.   Gastrointestinal: Negative.    Endocrine: Negative.    Genitourinary: Negative.    Musculoskeletal: Negative.  Negative for arthralgias, back pain and neck pain.   Skin: Negative.  Negative for rash and wound.   Allergic/Immunologic: Positive for environmental allergies.   Neurological: Negative.  Negative for dizziness, weakness, numbness and headaches.   Hematological: Bruises/bleeds easily (bruises/ bleeds).   Psychiatric/Behavioral: Positive for sleep disturbance (falling. staying asleep ).       Objective   Vitals:    09/22/21 0914   BP: 134/51   BP Location: Left arm   Patient Position: Sitting   Pulse: 55   SpO2: 99%   Weight: 52.1 kg (114 lb 12.8 oz)   Height: 162.6 cm (64\")      /51 (BP Location: Left arm, Patient Position: Sitting)   Pulse 55   Ht 162.6 cm (64\")   Wt 52.1 kg (114 lb 12.8 oz)   SpO2 99%   BMI 19.71 kg/m²    Lab Results (most recent)     None        Physical Exam  Vitals and nursing note reviewed. "   Constitutional:       General: She is not in acute distress.     Appearance: She is well-developed.   HENT:      Head: Normocephalic and atraumatic.   Eyes:      Conjunctiva/sclera: Conjunctivae normal.      Pupils: Pupils are equal, round, and reactive to light.   Neck:      Vascular: No JVD.      Trachea: No tracheal deviation.   Cardiovascular:      Rate and Rhythm: Normal rate and regular rhythm.      Heart sounds: Normal heart sounds.   Pulmonary:      Effort: Pulmonary effort is normal.      Breath sounds: Normal breath sounds.   Abdominal:      General: Bowel sounds are normal. There is no distension.      Palpations: Abdomen is soft. There is no mass.      Tenderness: There is no abdominal tenderness. There is no guarding or rebound.   Musculoskeletal:         General: No tenderness or deformity. Normal range of motion.      Cervical back: Normal range of motion and neck supple.   Skin:     General: Skin is warm and dry.      Coloration: Skin is not pale.      Findings: No erythema or rash.   Neurological:      Mental Status: She is alert and oriented to person, place, and time.   Psychiatric:         Behavior: Behavior normal.         Thought Content: Thought content normal.         Judgment: Judgment normal.           Procedure   Procedures       Assessment/Plan      Diagnosis Plan   1. Dyspnea on exertion     2. Palpitations     3. Essential hypertension       1.  Clinically, the patient is doing well.  Her dyspnea is at baseline.  She really has no palpitations at this time.  She has no further cardiovascular symptoms or issues at this time.    2.  The patient continues to be normotensive when checked at home.  She will monitor that and call to us for any issues.    3.  I would make no adjustments in medical regimen.  The patient will call to us for any complications.  Nothing further and we will continue to see her on a yearly evaluation.                   Electronically signed by:

## 2022-09-22 ENCOUNTER — OFFICE VISIT (OUTPATIENT)
Dept: CARDIOLOGY | Facility: CLINIC | Age: 72
End: 2022-09-22

## 2022-09-22 VITALS
SYSTOLIC BLOOD PRESSURE: 137 MMHG | BODY MASS INDEX: 19.77 KG/M2 | HEART RATE: 69 BPM | HEIGHT: 64 IN | OXYGEN SATURATION: 97 % | DIASTOLIC BLOOD PRESSURE: 77 MMHG | WEIGHT: 115.8 LBS

## 2022-09-22 DIAGNOSIS — I10 ESSENTIAL HYPERTENSION: ICD-10-CM

## 2022-09-22 DIAGNOSIS — R00.2 PALPITATIONS: Primary | ICD-10-CM

## 2022-09-22 DIAGNOSIS — R06.09 DYSPNEA ON EXERTION: ICD-10-CM

## 2022-09-22 PROCEDURE — 93000 ELECTROCARDIOGRAM COMPLETE: CPT | Performed by: PHYSICIAN ASSISTANT

## 2022-09-22 PROCEDURE — 99213 OFFICE O/P EST LOW 20 MIN: CPT | Performed by: PHYSICIAN ASSISTANT

## 2022-09-22 RX ORDER — ACETAMINOPHEN,DIPHENHYDRAMINE HCL 500; 25 MG/1; MG/1
1 TABLET, FILM COATED ORAL NIGHTLY PRN
COMMUNITY

## 2022-09-22 NOTE — PROGRESS NOTES
Problem list     Subjective   Kellee Garcia is a 72 y.o. female     Chief Complaint   Patient presents with   • Follow-up     Annual F/U    Problem List:      1. Minor nonobstructive CAD per cath, 2008.  2. Palpitations  3. Dyspnea, stable      4. TIA, by patient report.   HPI    The patient presents in the clinic today for routine evaluation and follow-up.  She has continued to do well since last evaluation.  Palpitations remain minimal since starting verapamil.  She certainly has no sustained dysrhythmic activity.  She has no dizziness nor syncope.  Dizziness is at baseline.  She has no chest pain.  Exercise capacity is adequate and without limitation from cardiovascular standpoint.  Laboratories have been normal as followed through her primary care provider.  The patient has no further complaints.  Current Outpatient Medications on File Prior to Visit   Medication Sig Dispense Refill   • aspirin 81 MG tablet Take 81 mg by mouth 1 (One) Time Per Week.     • atorvastatin (LIPITOR) 10 MG tablet Take 10 mg by mouth Daily.     • Black Cohosh 160 MG capsule Take 160 mg by mouth Daily.     • cholecalciferol (VITAMIN D3) 1000 UNITS tablet Take 1,000 Units by mouth 2 (two) times a day.     • diphenhydrAMINE-acetaminophen (TYLENOL PM)  MG tablet per tablet Take 1 tablet by mouth At Night As Needed for Sleep.     • L-Arginine 500 MG capsule Take 500 mg by mouth Daily.     • LINZESS 290 MCG capsule capsule Take 290 mcg by mouth Daily.     • Omega 3 1000 MG capsule Take 1,000 mg by mouth Daily.     • pantoprazole (PROTONIX) 40 MG EC tablet Daily.     • sucralfate (CARAFATE) 1 g tablet Take 1 tablet by mouth 4 (Four) Times a Day. 56 tablet 0   • verapamil (CALAN) 40 MG tablet Take 40 mg by mouth 2 (Two) Times a Day.     • vitamin B-12 (CYANOCOBALAMIN) 100 MCG tablet Take 50 mcg by mouth Daily.     • [DISCONTINUED] calcium (OS-GERRY) 600 MG tablet Take 600 mg by mouth Daily.       No current facility-administered  "medications on file prior to visit.       Contrast dye, Isosorbide nitrate, and Sulfa antibiotics    Past Medical History:   Diagnosis Date   • Hyperlipidemia    • Hypertension        Social History     Socioeconomic History   • Marital status:    Tobacco Use   • Smoking status: Former Smoker   • Smokeless tobacco: Never Used   Substance and Sexual Activity   • Alcohol use: No   • Drug use: No   • Sexual activity: Defer       Family History   Problem Relation Age of Onset   • Diabetes Other    • Hypertension Other        Review of Systems   Constitutional: Negative for fatigue.   HENT: Negative for congestion, rhinorrhea and sore throat.    Eyes: Positive for visual disturbance (Glasses or contacts daily ).   Respiratory: Negative.  Negative for chest tightness and shortness of breath.    Cardiovascular: Negative.  Negative for chest pain, palpitations and leg swelling.   Gastrointestinal: Negative.  Negative for abdominal pain, blood in stool, constipation, diarrhea, nausea and vomiting.   Endocrine: Negative.  Negative for cold intolerance and heat intolerance.        Feet get cold- denies numbness    Genitourinary: Negative.  Negative for difficulty urinating, dysuria, frequency, hematuria and urgency.   Musculoskeletal: Negative.  Negative for arthralgias, back pain and neck pain.   Skin: Negative.  Negative for rash and wound.   Allergic/Immunologic: Positive for environmental allergies. Negative for food allergies.   Neurological: Negative for dizziness, syncope, weakness, light-headedness, numbness and headaches.   Hematological: Bruises/bleeds easily (Bruises).   Psychiatric/Behavioral: Positive for sleep disturbance (Off and on sleep issues- uses Tylenol pm PRN ).       Objective   Vitals:    09/22/22 0855   BP: 137/77   Pulse: 69   SpO2: 97%   Weight: 52.5 kg (115 lb 12.8 oz)   Height: 162.6 cm (64\")      /77   Pulse 69   Ht 162.6 cm (64\")   Wt 52.5 kg (115 lb 12.8 oz)   SpO2 97%   BMI " 19.88 kg/m²    Lab Results (most recent)     None        Physical Exam  Vitals and nursing note reviewed.   Constitutional:       General: She is not in acute distress.     Appearance: She is well-developed.   HENT:      Head: Normocephalic and atraumatic.   Eyes:      Conjunctiva/sclera: Conjunctivae normal.      Pupils: Pupils are equal, round, and reactive to light.   Neck:      Vascular: No JVD.      Trachea: No tracheal deviation.   Cardiovascular:      Rate and Rhythm: Normal rate and regular rhythm.      Heart sounds: Normal heart sounds.   Pulmonary:      Effort: Pulmonary effort is normal.      Breath sounds: Normal breath sounds.   Abdominal:      General: Bowel sounds are normal. There is no distension.      Palpations: Abdomen is soft. There is no mass.      Tenderness: There is no abdominal tenderness. There is no guarding or rebound.   Musculoskeletal:         General: No tenderness or deformity. Normal range of motion.      Cervical back: Normal range of motion and neck supple.   Skin:     General: Skin is warm and dry.      Coloration: Skin is not pale.      Findings: No erythema or rash.   Neurological:      Mental Status: She is alert and oriented to person, place, and time.   Psychiatric:         Behavior: Behavior normal.         Thought Content: Thought content normal.         Judgment: Judgment normal.           Procedure     ECG 12 Lead    Date/Time: 9/22/2022 8:55 AM  Performed by: Eamon Gayle PA  Authorized by: Eamon Gayle PA   Comparison: compared with previous ECG from 6/5/2021  Comparison to previous ECG: Sinus rhythm, rate 57, normal axis, no acute changes noted.                 Assessment & Plan      Diagnosis Plan   1. Palpitations     2. Dyspnea on exertion     3. Essential hypertension       1.  At this time, the patient appears to be doing well.  Palpitations are minimal on current medical regimen by her report.  She will call back for any recurrence of symptoms.    2.   Dyspnea remains at baseline as well.  She currently has no further cardiovascular symptoms.    3.  She typically is normotensive when checked at home.  She will monitor that and call for any complications.    4.  As the patient is doing well, nothing further and we will continue to see her on routine yearly follow-ups.        Advance Care Planning   ACP discussion was declined by the patient. Patient has an advance directive (not in EMR), copy requested.             Electronically signed by:

## 2023-09-18 ENCOUNTER — TELEPHONE (OUTPATIENT)
Dept: CARDIOLOGY | Facility: CLINIC | Age: 73
End: 2023-09-18

## 2023-09-18 NOTE — TELEPHONE ENCOUNTER
Caller: Jorge Garcia    Relationship to patient: Emergency Contact    Best call back number: [phone]    Chief complaint: PT HAD A DEATH IN THE FAMILY AND IS NEEDING TO SCHEDULE OUT IN CONJUNCTION WITH HER .     Type of visit: ANNUAL FU    Requested date: ASAP, BUT IN CONJUNCTION WITH HER  EXCEPT 09.29.23    If rescheduling, when is the original appointment: 09.22.23     Additional notes: [facility] HAS NOTHING UNTIL MAY OF 2024

## 2023-09-21 NOTE — TELEPHONE ENCOUNTER
S/w pt she stated she was not having any problems it was ok to do her appt next available. Pt was ok with a May 2024 appt. Told pt if developed any problems to call us we would try to get her worked in with someone.

## 2024-04-24 ENCOUNTER — TELEPHONE (OUTPATIENT)
Dept: CARDIOLOGY | Facility: CLINIC | Age: 74
End: 2024-04-24
Payer: MEDICARE

## 2024-04-24 NOTE — TELEPHONE ENCOUNTER
REQUEST FOR CARDIAC CLEARANCE    Caller name: Kellee Garcia     Phone Number: [phone]    Surgeon's name: DR. REESE    Type of planned surgery: SHOULDER SURGERY    Date of planned surgery: 4.30.2024    Type of anesthesia: UNKNOWN    Have you been experiencing chest pain or shortness of breath? NO    Is your doctor requesting for you to stop any of your medications prior to your surgery? NONE    Where should we fax the clearance to? [phone] PLEASE CALL THE PATIENT TO ADVISE ANY ISSUES.

## 2024-04-24 NOTE — TELEPHONE ENCOUNTER
RECEIVED CC LATER YESTERDAY AFTERNOON, HOWEVER PT HAS NOT BEEN SEEN SINCE 2022. I JUST CALLED THE PT AND LEFT A VOICEMAIL TO OFFER AN APPT WITH GENNY FOR THIS AFTERNOON @ 1:15.

## 2024-04-24 NOTE — TELEPHONE ENCOUNTER
Called patient informed her that Dr. Farias is performing surgery we will need them to fax our office a cardiac clearance letter as I do not see one in her chart. Patient states I do not have heart issues I see beulah for preventative measures.     Called spoke with Alexandra at [facility] requested clearance letter be faxed.

## 2024-04-24 NOTE — TELEPHONE ENCOUNTER
relay      Got a hold of the pt, PT sated  [facility] told her not to worry about getting a CC, that she didn't need one. Called [facility] to confirm but was unable to get a hold of an MA, LVM to call back if they did not need a CC.

## 2024-07-18 ENCOUNTER — OFFICE VISIT (OUTPATIENT)
Dept: CARDIOLOGY | Facility: CLINIC | Age: 74
End: 2024-07-18
Payer: MEDICARE

## 2024-07-18 VITALS
SYSTOLIC BLOOD PRESSURE: 139 MMHG | DIASTOLIC BLOOD PRESSURE: 65 MMHG | HEIGHT: 64 IN | WEIGHT: 108.2 LBS | HEART RATE: 60 BPM | OXYGEN SATURATION: 98 % | BODY MASS INDEX: 18.47 KG/M2

## 2024-07-18 DIAGNOSIS — R06.09 DYSPNEA ON EXERTION: ICD-10-CM

## 2024-07-18 DIAGNOSIS — I10 PRIMARY HYPERTENSION: ICD-10-CM

## 2024-07-18 DIAGNOSIS — R00.2 PALPITATIONS: Primary | ICD-10-CM

## 2024-07-18 RX ORDER — IPRATROPIUM BROMIDE 21 UG/1
2 SPRAY, METERED NASAL DAILY
COMMUNITY
Start: 2024-04-22

## 2024-07-18 RX ORDER — GINSENG 100 MG
CAPSULE ORAL
COMMUNITY

## 2024-07-18 NOTE — PROGRESS NOTES
Problem list     Subjective   Kellee Garcia is a 74 y.o. female     Chief Complaint   Patient presents with    Follow-up     Yearly follow up    Problem List:      1. Minor nonobstructive CAD per cath, 2008.  2. Palpitations  3. Dyspnea, stable      4. TIA, by patient report.     HPI  The patient presents in the clinic today for routine evaluation and follow-up.  She continues to do well at this time.  She reports no palpitations on current medical regimen.  The patient denies chest pain.  Dyspnea is at baseline.  Exercise capacity is adequate and without limitation.  To her knowledge, she typically is normotensive.  She has no further complaints and feels that she is doing very well.    Current Outpatient Medications on File Prior to Visit   Medication Sig Dispense Refill    aspirin 81 MG tablet Take 1 tablet by mouth 1 (One) Time Per Week.      atorvastatin (LIPITOR) 10 MG tablet Take 1 tablet by mouth Daily.      Black Cohosh 160 MG capsule Take 160 mg by mouth Daily.      cholecalciferol (VITAMIN D3) 1000 UNITS tablet Take 1 tablet by mouth 2 (Two) Times a Day.      diphenhydrAMINE-acetaminophen (TYLENOL PM)  MG tablet per tablet Take 1 tablet by mouth At Night As Needed for Sleep.      ipratropium (ATROVENT) 0.03 % nasal spray 2 sprays into the nostril(s) as directed by provider Daily.      LINZESS 290 MCG capsule capsule Take 1 capsule by mouth Daily.      pantoprazole (PROTONIX) 40 MG EC tablet Daily.      verapamil (CALAN) 40 MG tablet Take 0.5 tablets by mouth Daily.      vitamin B-12 (CYANOCOBALAMIN) 100 MCG tablet Take 0.5 tablets by mouth Daily.      Zinc 50 MG tablet Take  by mouth. daily      [DISCONTINUED] L-Arginine 500 MG capsule Take 500 mg by mouth Daily.      [DISCONTINUED] Omega 3 1000 MG capsule Take 1,000 mg by mouth Daily.      [DISCONTINUED] sucralfate (CARAFATE) 1 g tablet Take 1 tablet by mouth 4 (Four) Times a Day. 56 tablet 0     No current facility-administered medications on  "file prior to visit.       Contrast dye (echo or unknown ct/mr), Isosorbide nitrate, Sulfa antibiotics, and Wound dressing adhesive    Past Medical History:   Diagnosis Date    Abnormal ECG     Hyperlipidemia     Hypertension        Social History     Socioeconomic History    Marital status:    Tobacco Use    Smoking status: Former     Current packs/day: 0.00     Average packs/day: 1 pack/day for 15.0 years (15.0 ttl pk-yrs)     Types: Cigarettes     Quit date: 2006     Years since quittin.6    Smokeless tobacco: Never   Substance and Sexual Activity    Alcohol use: No    Drug use: No    Sexual activity: Not Currently     Partners: Male     Birth control/protection: None       Family History   Problem Relation Age of Onset    Diabetes Other     Hypertension Other        Review of Systems   Constitutional: Negative.  Negative for chills, diaphoresis, fatigue and fever.   HENT: Negative.     Eyes: Negative.  Negative for visual disturbance.   Respiratory: Negative.  Negative for apnea, cough, chest tightness, shortness of breath and wheezing.    Cardiovascular: Negative.  Negative for chest pain, palpitations and leg swelling.   Gastrointestinal: Negative.  Negative for abdominal pain and blood in stool.   Endocrine: Negative.    Genitourinary: Negative.  Negative for hematuria.   Musculoskeletal:  Positive for arthralgias and neck stiffness. Negative for back pain, myalgias and neck pain.   Skin: Negative.  Negative for rash and wound.   Allergic/Immunologic: Negative.  Negative for environmental allergies and food allergies.   Neurological: Negative.  Negative for dizziness, syncope, weakness, light-headedness, numbness and headaches.   Hematological:  Bruises/bleeds easily (bruises easily).   Psychiatric/Behavioral: Negative.  Negative for sleep disturbance.        Objective   Vitals:    24 0909   BP: 139/65   Pulse: 60   SpO2: 98%   Weight: 49.1 kg (108 lb 3.2 oz)   Height: 162.6 cm (64\") " "     /65   Pulse 60   Ht 162.6 cm (64\")   Wt 49.1 kg (108 lb 3.2 oz)   SpO2 98%   BMI 18.57 kg/m²    Lab Results (most recent)       None          Physical Exam  Vitals and nursing note reviewed.   Constitutional:       General: She is not in acute distress.     Appearance: She is well-developed.   HENT:      Head: Normocephalic and atraumatic.   Eyes:      Conjunctiva/sclera: Conjunctivae normal.      Pupils: Pupils are equal, round, and reactive to light.   Neck:      Vascular: No JVD.      Trachea: No tracheal deviation.   Cardiovascular:      Rate and Rhythm: Normal rate and regular rhythm.      Heart sounds: Normal heart sounds.   Pulmonary:      Effort: Pulmonary effort is normal.      Breath sounds: Normal breath sounds.   Abdominal:      General: Bowel sounds are normal. There is no distension.      Palpations: Abdomen is soft. There is no mass.      Tenderness: There is no abdominal tenderness. There is no guarding or rebound.   Musculoskeletal:         General: No tenderness or deformity. Normal range of motion.      Cervical back: Normal range of motion and neck supple.   Skin:     General: Skin is warm and dry.      Coloration: Skin is not pale.      Findings: No erythema or rash.   Neurological:      Mental Status: She is alert and oriented to person, place, and time.   Psychiatric:         Behavior: Behavior normal.         Thought Content: Thought content normal.         Judgment: Judgment normal.           Procedure     ECG 12 Lead    Date/Time: 7/18/2024 9:12 AM  Performed by: Eamon Gayle PA    Authorized by: Eamon Gayle PA  Comparison: compared with previous ECG from 9/22/2022  Comparison to previous ECG: Sinus rhythm, rate 59, normal axis, no acute changes noted.             Assessment & Plan      Diagnosis Plan   1. Palpitations        2. Dyspnea on exertion        3. Primary hypertension          1.  At this time, the patient appears to be doing well.  Palpitations remain " nonproblematic.  Dyspnea is at baseline.  She has no further cardiovascular symptoms or issues.    2.  We will continue medical regimen without change.    3.  For change in clinical course she will call immediately.  Nothing further and we will see her on routine 6 to 12-month intervals.           Advance Care Planning   ACP discussion was held with the patient during this visit. Patient has an advance directive (not in EMR), copy requested.         Electronically signed by:

## 2025-07-21 ENCOUNTER — OFFICE VISIT (OUTPATIENT)
Dept: CARDIOLOGY | Facility: CLINIC | Age: 75
End: 2025-07-21
Payer: MEDICARE

## 2025-07-21 VITALS
HEART RATE: 54 BPM | WEIGHT: 105 LBS | BODY MASS INDEX: 17.93 KG/M2 | HEIGHT: 64 IN | DIASTOLIC BLOOD PRESSURE: 73 MMHG | OXYGEN SATURATION: 97 % | SYSTOLIC BLOOD PRESSURE: 136 MMHG

## 2025-07-21 DIAGNOSIS — I10 PRIMARY HYPERTENSION: ICD-10-CM

## 2025-07-21 DIAGNOSIS — R00.2 PALPITATIONS: Primary | ICD-10-CM

## 2025-07-21 DIAGNOSIS — R06.09 DYSPNEA ON EXERTION: ICD-10-CM

## 2025-07-21 RX ORDER — SULFASALAZINE 500 MG
10000 TABLET ORAL
COMMUNITY

## 2025-07-21 NOTE — PROGRESS NOTES
Problem list     Subjective   Kellee Garcia is a 75 y.o. female     Chief Complaint   Patient presents with    Follow-up     Yearly follow up    Problem List:      1. Minor nonobstructive CAD per cath, 2008.  2. Palpitations  3. Dyspnea, stable      4. TIA, by patient report.     HPI  The patient presents in the clinic today for yearly follow-up.  From general cardiovascular standpoint, she really has done well.  Palpitations remain minimal on current medical therapy.  She denies chest pain.  Dyspnea is minimal and at baseline.  She reports adequate exercise capacity without limitation from cardiac standpoint.  She has no further complaints and continues to do well.        Current Outpatient Medications on File Prior to Visit   Medication Sig Dispense Refill    Aloe Vera Concentrate 25 MG capsule Take 10,000 mg by mouth.      aspirin 81 MG tablet Take 1 tablet by mouth 1 (One) Time Per Week.      atorvastatin (LIPITOR) 10 MG tablet Take 1 tablet by mouth Daily.      Black Cohosh 160 MG capsule Take 160 mg by mouth Daily.      cholecalciferol (VITAMIN D3) 1000 UNITS tablet Take 1 tablet by mouth 2 (Two) Times a Day.      diphenhydrAMINE-acetaminophen (TYLENOL PM)  MG tablet per tablet Take 1 tablet by mouth At Night As Needed for Sleep.      ipratropium (ATROVENT) 0.03 % nasal spray Administer 2 sprays into the nostril(s) as directed by provider Daily.      LINZESS 290 MCG capsule capsule Take 1 capsule by mouth Daily.      pantoprazole (PROTONIX) 40 MG EC tablet Daily.      verapamil (CALAN) 40 MG tablet Take 0.5 tablets by mouth Daily.      vitamin B-12 (CYANOCOBALAMIN) 100 MCG tablet Take 0.5 tablets by mouth Daily.      Zinc 50 MG tablet Take  by mouth. daily      [DISCONTINUED] NON FORMULARY Aloe vera 10,000 daily       No current facility-administered medications on file prior to visit.       Contrast dye (echo or unknown ct/mr), Isosorbide nitrate, Sulfa antibiotics, and Wound dressing adhesive    Past  Medical History:   Diagnosis Date    Abnormal ECG     Aneurysm 2018    Hyperlipidemia     Hypertension     Stroke 2018       Social History     Socioeconomic History    Marital status:    Tobacco Use    Smoking status: Former     Current packs/day: 0.00     Average packs/day: 1 pack/day for 15.0 years (15.0 ttl pk-yrs)     Types: Cigarettes     Start date:      Quit date: 2006     Years since quittin.6    Smokeless tobacco: Never   Vaping Use    Vaping status: Never Used   Substance and Sexual Activity    Alcohol use: No    Drug use: No    Sexual activity: Not Currently     Partners: Male     Birth control/protection: None, Hysterectomy       Family History   Problem Relation Age of Onset    Diabetes Other     Hypertension Other     Heart disease Mother         A fib    Heart attack Father         Had a stint put in       Review of Systems   Constitutional: Negative.  Negative for chills, diaphoresis, fatigue and fever.   HENT: Negative.  Negative for hearing loss.    Eyes: Negative.  Negative for visual disturbance.   Respiratory: Negative.  Negative for apnea, cough, chest tightness, shortness of breath and wheezing.    Cardiovascular: Negative.  Negative for chest pain, palpitations and leg swelling.   Gastrointestinal: Negative.  Negative for abdominal pain and blood in stool.   Endocrine: Negative.    Genitourinary: Negative.  Negative for hematuria.   Musculoskeletal: Negative.  Negative for arthralgias, back pain, myalgias, neck pain and neck stiffness.   Skin: Negative.  Negative for rash and wound.   Allergic/Immunologic: Negative.  Negative for environmental allergies and food allergies.   Neurological: Negative.  Negative for dizziness, syncope, weakness, light-headedness, numbness and headaches.   Hematological:  Bruises/bleeds easily.   Psychiatric/Behavioral: Negative.  Negative for sleep disturbance.        Objective   Vitals:    25 1111   BP: 136/73   Pulse: 54   SpO2: 97%  "  Weight: 47.6 kg (105 lb)   Height: 162.6 cm (64\")      /73   Pulse 54   Ht 162.6 cm (64\")   Wt 47.6 kg (105 lb)   SpO2 97%   BMI 18.02 kg/m²    Lab Results (most recent)       None          Physical Exam  Vitals and nursing note reviewed.   Constitutional:       General: She is not in acute distress.     Appearance: She is well-developed.   HENT:      Head: Normocephalic and atraumatic.   Eyes:      Conjunctiva/sclera: Conjunctivae normal.      Pupils: Pupils are equal, round, and reactive to light.   Neck:      Vascular: No JVD.      Trachea: No tracheal deviation.   Cardiovascular:      Rate and Rhythm: Normal rate and regular rhythm.      Heart sounds: Normal heart sounds.   Pulmonary:      Effort: Pulmonary effort is normal.      Breath sounds: Normal breath sounds.   Abdominal:      General: Bowel sounds are normal. There is no distension.      Palpations: Abdomen is soft. There is no mass.      Tenderness: There is no abdominal tenderness. There is no guarding or rebound.   Musculoskeletal:         General: No tenderness or deformity. Normal range of motion.      Cervical back: Normal range of motion and neck supple.   Skin:     General: Skin is warm and dry.      Coloration: Skin is not pale.      Findings: No erythema or rash.   Neurological:      Mental Status: She is alert and oriented to person, place, and time.   Psychiatric:         Behavior: Behavior normal.         Thought Content: Thought content normal.         Judgment: Judgment normal.           Procedure     ECG 12 Lead    Date/Time: 7/21/2025 11:18 AM  Performed by: Eamon Gayle PA    Authorized by: Eamon Gayle PA  Comparison: compared with previous ECG from 7/18/2024  Comparison to previous ECG: Sinus rhythm, rate 54, axis, no acute changes noted.             Assessment & Plan      Diagnosis Plan   1. Palpitations        2. Dyspnea on exertion        3. Primary hypertension        1.  At this time, the patient appears to " be doing well from general cardiovascular standpoint.  Again on current medical regimen, she denies palpitations.  She otherwise has baseline dyspnea.  She has no angina or further cardiovascular symptoms.    2.  Blood pressures are well-maintained on current therapy.  No adjustments will be made.    3.  As the patient is doing well, nothing further.  We will continue to see her on routine 6 to 12-month intervals, sooner for complications.             Kellee Garcia  reports that she quit smoking about 18 years ago. Her smoking use included cigarettes. She started smoking about 52 years ago. She has a 15 pack-year smoking history. She has never used smokeless tobacco.     Advance Care Planning   Advance Care Planning   ACP discussion was held with the patient during this visit. Patient has an advance directive (not in EMR), copy requested.            Electronically signed by: